# Patient Record
Sex: MALE | Race: WHITE | Employment: FULL TIME | URBAN - METROPOLITAN AREA
[De-identification: names, ages, dates, MRNs, and addresses within clinical notes are randomized per-mention and may not be internally consistent; named-entity substitution may affect disease eponyms.]

---

## 2023-02-10 ENCOUNTER — OFFICE VISIT (OUTPATIENT)
Dept: FAMILY MEDICINE CLINIC | Facility: CLINIC | Age: 23
End: 2023-02-10
Payer: COMMERCIAL

## 2023-02-10 VITALS
HEART RATE: 77 BPM | RESPIRATION RATE: 14 BRPM | OXYGEN SATURATION: 100 % | SYSTOLIC BLOOD PRESSURE: 114 MMHG | WEIGHT: 177 LBS | DIASTOLIC BLOOD PRESSURE: 68 MMHG | TEMPERATURE: 99 F

## 2023-02-10 DIAGNOSIS — R53.83 DECREASED ENERGY: ICD-10-CM

## 2023-02-10 DIAGNOSIS — R22.1 NECK SWELLING: Primary | ICD-10-CM

## 2023-02-10 DIAGNOSIS — R50.9 FEVER, INTERMITTENT: ICD-10-CM

## 2023-02-10 PROCEDURE — 99203 OFFICE O/P NEW LOW 30 MIN: CPT | Performed by: PHYSICIAN ASSISTANT

## 2023-02-10 PROCEDURE — 3074F SYST BP LT 130 MM HG: CPT | Performed by: PHYSICIAN ASSISTANT

## 2023-02-10 PROCEDURE — 3078F DIAST BP <80 MM HG: CPT | Performed by: PHYSICIAN ASSISTANT

## 2023-02-11 ENCOUNTER — HOSPITAL ENCOUNTER (OUTPATIENT)
Age: 23
Discharge: HOME | End: 2023-02-11
Payer: COMMERCIAL

## 2023-02-11 VITALS
RESPIRATION RATE: 20 BRPM | TEMPERATURE: 98 F | OXYGEN SATURATION: 96 % | HEART RATE: 110 BPM | SYSTOLIC BLOOD PRESSURE: 123 MMHG | DIASTOLIC BLOOD PRESSURE: 82 MMHG

## 2023-02-11 DIAGNOSIS — B27.90 INFECTIOUS MONONUCLEOSIS WITHOUT COMPLICATION, INFECTIOUS MONONUCLEOSIS DUE TO UNSPECIFIED ORGANISM: Primary | ICD-10-CM

## 2023-02-11 DIAGNOSIS — R59.1 LYMPHADENOPATHY: ICD-10-CM

## 2023-02-11 LAB
#MXD IC: 1.3 X10ˆ3/UL (ref 0.1–1)
BUN BLD-MCNC: 6 MG/DL (ref 7–18)
CHLORIDE BLD-SCNC: 101 MMOL/L (ref 98–112)
CO2 BLD-SCNC: 25 MMOL/L (ref 21–32)
CREAT BLD-MCNC: 1 MG/DL
GFR SERPLBLD BASED ON 1.73 SQ M-ARVRAT: 109 ML/MIN/1.73M2 (ref 60–?)
GLUCOSE BLD-MCNC: 99 MG/DL (ref 70–99)
HCT VFR BLD AUTO: 38.1 %
HCT VFR BLD CALC: 37 %
HGB BLD-MCNC: 13 G/DL
ISTAT IONIZED CALCIUM FOR CHEM 8: 1.15 MMOL/L (ref 1.12–1.32)
LYMPHOCYTES # BLD AUTO: 6.9 X10ˆ3/UL (ref 1–4)
LYMPHOCYTES NFR BLD AUTO: 58.8 %
MCH RBC QN AUTO: 29.5 PG (ref 26–34)
MCHC RBC AUTO-ENTMCNC: 34.1 G/DL (ref 31–37)
MCV RBC AUTO: 86.6 FL (ref 80–100)
MIXED CELL %: 11.4 %
NEUTROPHILS # BLD AUTO: 3.5 X10ˆ3/UL (ref 1.5–7.7)
NEUTROPHILS NFR BLD AUTO: 29.8 %
PLATELET # BLD AUTO: 277 X10ˆ3/UL (ref 150–450)
POCT INFLUENZA A: NEGATIVE
POCT INFLUENZA B: NEGATIVE
POCT MONO: POSITIVE
POTASSIUM BLD-SCNC: 3.8 MMOL/L (ref 3.6–5.1)
RBC # BLD AUTO: 4.4 X10ˆ6/UL
S PYO AG THROAT QL: NEGATIVE
SODIUM BLD-SCNC: 137 MMOL/L (ref 136–145)
WBC # BLD AUTO: 11.7 X10ˆ3/UL (ref 4–11)

## 2023-02-11 PROCEDURE — 87880 STREP A ASSAY W/OPTIC: CPT | Performed by: PHYSICIAN ASSISTANT

## 2023-02-11 PROCEDURE — 80047 BASIC METABLC PNL IONIZED CA: CPT | Performed by: PHYSICIAN ASSISTANT

## 2023-02-11 PROCEDURE — 85025 COMPLETE CBC W/AUTO DIFF WBC: CPT | Performed by: PHYSICIAN ASSISTANT

## 2023-02-11 PROCEDURE — 87502 INFLUENZA DNA AMP PROBE: CPT | Performed by: PHYSICIAN ASSISTANT

## 2023-02-11 PROCEDURE — 86308 HETEROPHILE ANTIBODY SCREEN: CPT | Performed by: PHYSICIAN ASSISTANT

## 2023-02-11 PROCEDURE — 99213 OFFICE O/P EST LOW 20 MIN: CPT | Performed by: PHYSICIAN ASSISTANT

## 2023-02-11 RX ORDER — METHYLPREDNISOLONE 4 MG/1
TABLET ORAL
Qty: 1 EACH | Refills: 0 | Status: SHIPPED | OUTPATIENT
Start: 2023-02-11

## 2023-02-11 NOTE — DISCHARGE INSTRUCTIONS
Use Flonase as directed for nasal congestion. Salt water gargles for sore throat. Take Tylenol and ibuprofen as directed. Follow-up with your primary care provider. Go to the ER with any new or worsening symptoms.

## 2023-02-11 NOTE — ED INITIAL ASSESSMENT (HPI)
Pt has had 3 days of intermittent fevers with lack of appetite, and fatigue.   He has swollen lymph nodes to his neck L>R and has been to 2 other immediate cares rendering negative streps and covids

## 2023-02-11 NOTE — PATIENT INSTRUCTIONS
Moist heat   Ibuprofen OTC for pain and swelling   Do not press/touch area   ED for neck pain, fever, or if any breathing issues   Close PCP follow up for further workup

## (undated) NOTE — LETTER
Date & Time: 2/11/2023, 10:15 AM  Patient: Claus Anthony  Encounter Provider(s):    Андрей Wolfe PA-C       To Whom It May Concern:    Claus Anthony was seen and treated in our department on 2/11/2023. He should not return to work until fever free for 24 hours and general improvement in symptoms.     If you have any questions or concerns, please do not hesitate to call.        _____________________________  Physician/[facility] Signature